# Patient Record
Sex: FEMALE | URBAN - NONMETROPOLITAN AREA
[De-identification: names, ages, dates, MRNs, and addresses within clinical notes are randomized per-mention and may not be internally consistent; named-entity substitution may affect disease eponyms.]

---

## 2020-12-08 ENCOUNTER — APPOINTMENT (RX ONLY)
Dept: URBAN - NONMETROPOLITAN AREA CLINIC 13 | Facility: CLINIC | Age: 60
Setting detail: DERMATOLOGY
End: 2020-12-08

## 2020-12-08 DIAGNOSIS — Z41.9 ENCOUNTER FOR PROCEDURE FOR PURPOSES OTHER THAN REMEDYING HEALTH STATE, UNSPECIFIED: ICD-10-CM

## 2020-12-08 PROCEDURE — ? JUVEDERM VOLBELLA INJECTION

## 2020-12-08 NOTE — PROCEDURE: JUVEDERM VOLBELLA INJECTION
Lot #: [id number]
Additional Area 2 Location: glabella
Post-Care Instructions: Patient instructed to apply ice to reduce swelling.
Additional Area 4 Location: perioral rhytides
Jawline Filler Volume In Cc: 0
Use Map Statement For Sites (Optional): No
Additional Anesthesia Volume In Cc: 6
Procedural Text: The filler was administered to the treatment areas noted above.
Number Of Syringes (Required For Inventory): 1
Anesthesia Volume In Cc: 0.5
Additional Area 4 Volume In Cc: 0.2
Vermilion Lips Filler Volume In Cc: 0.8
Additional Area 5 Location: lower face rhytides
Additional Area 3 Location: corners of mouth,
Additional Area 1 Location: vermillion border,corners of mouth
Map Statment: See Attach Map for Complete Details
Price (Use Numbers Only, No Special Characters Or $): 018
Expiration Date (Month Year): 12/21
Filler: Juvederm Volbella XC
Detail Level: Detailed
Anesthesia Type: 1% lidocaine with epinephrine
Consent: Written consent obtained. Risks include but not limited to bruising, beading, irregular texture, ulceration, infection, allergic reaction, scar formation, incomplete augmentation, temporary nature, procedural pain.